# Patient Record
Sex: MALE | Race: WHITE | NOT HISPANIC OR LATINO | Employment: OTHER
[De-identification: names, ages, dates, MRNs, and addresses within clinical notes are randomized per-mention and may not be internally consistent; named-entity substitution may affect disease eponyms.]

---

## 2017-08-24 ENCOUNTER — ESTABLISHED COMPREHENSIVE EXAM (OUTPATIENT)
Age: 22
End: 2017-08-24

## 2017-08-24 DIAGNOSIS — H52.13: ICD-10-CM

## 2017-08-24 DIAGNOSIS — H52.203: ICD-10-CM

## 2017-08-24 PROCEDURE — 92310-1 LEVEL 1 CONTACT LENS MANAGEMENT

## 2017-08-24 PROCEDURE — 92015 DETERMINE REFRACTIVE STATE: CPT

## 2017-08-24 PROCEDURE — 92014 COMPRE OPH EXAM EST PT 1/>: CPT

## 2017-08-24 ASSESSMENT — KERATOMETRY
OS_AXISANGLE2_DEGREES: 095
OS_K2POWER_DIOPTERS: 43.25
OS_K1POWER_DIOPTERS: 42.75
OS_AXISANGLE_DEGREES: 005
OD_AXISANGLE_DEGREES: 160
OD_AXISANGLE2_DEGREES: 070
OD_K1POWER_DIOPTERS: 42.50
OD_K2POWER_DIOPTERS: 43.25

## 2017-08-24 ASSESSMENT — TONOMETRY
OD_IOP_MMHG: 17
OS_IOP_MMHG: 17

## 2017-08-24 ASSESSMENT — VISUAL ACUITY
OD_CC: 20/20
OU_CC: 20/20
OS_CC: 20/20-1
OD_CC: J1
OU_CC: J1
OS_CC: J1

## 2020-03-05 ENCOUNTER — ESTABLISHED COMPREHENSIVE EXAM (OUTPATIENT)
Dept: URBAN - METROPOLITAN AREA CLINIC 38 | Facility: CLINIC | Age: 25
End: 2020-03-05

## 2020-03-05 DIAGNOSIS — H52.13: ICD-10-CM

## 2020-03-05 DIAGNOSIS — H52.201: ICD-10-CM

## 2020-03-05 DIAGNOSIS — Z97.3: ICD-10-CM

## 2020-03-05 PROCEDURE — 92015 DETERMINE REFRACTIVE STATE: CPT

## 2020-03-05 PROCEDURE — 92310-1 LEVEL 1 CONTACT LENS MANAGEMENT

## 2020-03-05 PROCEDURE — 92014 COMPRE OPH EXAM EST PT 1/>: CPT

## 2020-03-05 ASSESSMENT — KERATOMETRY
OD_AXISANGLE_DEGREES: 160
OD_K2POWER_DIOPTERS: 43.25
OD_K1POWER_DIOPTERS: 42.50
OS_K2POWER_DIOPTERS: 43.25
OS_AXISANGLE_DEGREES: 005
OD_AXISANGLE2_DEGREES: 070
OS_AXISANGLE2_DEGREES: 095
OS_K1POWER_DIOPTERS: 42.75

## 2020-03-05 ASSESSMENT — VISUAL ACUITY
OD_CC: J1
OS_CC: 20/20-1
OS_CC: J1
OD_CC: 20/20

## 2020-03-05 ASSESSMENT — TONOMETRY
OD_IOP_MMHG: 17
OS_IOP_MMHG: 18

## 2020-12-02 NOTE — PATIENT DISCUSSION
Continue: PreserVision AREDS 2 (vit c,g-bh-vaawp-lutein-zeaxan): capsule: [id number] mg-unit-mg-mg 1 capsule twice a day as directed by mouth

## 2020-12-02 NOTE — PATIENT DISCUSSION
New Prescription: timolol maleate (timolol maleate): drops: 0.5% 1 drop twice a day as directed into both eyes

## 2020-12-02 NOTE — PATIENT DISCUSSION
Posterior Capsular Fibrosis Counseling: The diagnosis of posterior capsular fibrosis (PCF), also referred to as a secondary cataract or posterior capsular opacification (PCO), was discussed with the patient. The patient understands that their symptoms and limitations are likely related to this condition. I have reviewed the risks, benefits and alternatives of  YAG laser surgery for the treatment of the fibrosis. The uncommon risk of an increase in intraocular pressure or a retinal detachment and their associated symptoms were explained to the patient. The patient understands and desires to proceed with the laser surgery to improve their vision for ___WATCHING TV___.

## 2020-12-02 NOTE — PATIENT DISCUSSION
POAG, OU:  INTRAOCULAR PRESSURE IS WITHIN ACCEPTABLE LIMITS. PATIENT INSTRUCTED TO BEGIN LATANOPROST QHS AND COSOPT BID OU_ AND RETURN FOR FOLLOW-UP AS SCHEDULED.

## 2021-03-09 ENCOUNTER — CONTACT LENS EXAM ESTABLISHED (OUTPATIENT)
Dept: URBAN - METROPOLITAN AREA CLINIC 38 | Facility: CLINIC | Age: 26
End: 2021-03-09

## 2021-03-09 DIAGNOSIS — H52.201: ICD-10-CM

## 2021-03-09 DIAGNOSIS — Z97.3: ICD-10-CM

## 2021-03-09 DIAGNOSIS — H52.13: ICD-10-CM

## 2021-03-09 PROCEDURE — 92310-1 LEVEL 1 CONTACT LENS MANAGEMENT

## 2021-03-09 PROCEDURE — 92014 COMPRE OPH EXAM EST PT 1/>: CPT

## 2021-03-09 PROCEDURE — 92015 DETERMINE REFRACTIVE STATE: CPT

## 2021-03-09 ASSESSMENT — VISUAL ACUITY
OS_CC: J1
OS_SC: 20/80-1
OD_SC: 20/50
OS_CC: 20/20
OU_CC: J1
OD_CC: J1
OD_CC: 20/20
OU_CC: 20/20

## 2021-03-09 ASSESSMENT — KERATOMETRY
OD_K1POWER_DIOPTERS: 42.50
OS_AXISANGLE2_DEGREES: 095
OS_K1POWER_DIOPTERS: 42.75
OD_AXISANGLE_DEGREES: 160
OS_K2POWER_DIOPTERS: 43.25
OD_AXISANGLE2_DEGREES: 070
OS_AXISANGLE_DEGREES: 005
OD_K2POWER_DIOPTERS: 43.25

## 2021-03-09 ASSESSMENT — TONOMETRY
OD_IOP_MMHG: 18
OS_IOP_MMHG: 16